# Patient Record
Sex: FEMALE | Race: WHITE | Employment: UNEMPLOYED | URBAN - METROPOLITAN AREA
[De-identification: names, ages, dates, MRNs, and addresses within clinical notes are randomized per-mention and may not be internally consistent; named-entity substitution may affect disease eponyms.]

---

## 2017-03-29 ENCOUNTER — HOSPITAL ENCOUNTER (EMERGENCY)
Age: 27
Discharge: HOME | End: 2017-03-29
Attending: EMERGENCY MEDICINE
Payer: SELF-PAY

## 2017-03-29 VITALS
RESPIRATION RATE: 22 BRPM | OXYGEN SATURATION: 98 % | HEART RATE: 80 BPM | SYSTOLIC BLOOD PRESSURE: 134 MMHG | TEMPERATURE: 97.3 F | DIASTOLIC BLOOD PRESSURE: 83 MMHG | HEIGHT: 71 IN | WEIGHT: 140 LBS | BODY MASS INDEX: 19.6 KG/M2

## 2017-03-29 DIAGNOSIS — F41.1 ANXIETY STATE: ICD-10-CM

## 2017-03-29 DIAGNOSIS — N30.00 ACUTE CYSTITIS WITHOUT HEMATURIA: Primary | ICD-10-CM

## 2017-03-29 DIAGNOSIS — R19.7 DIARRHEA, UNSPECIFIED TYPE: ICD-10-CM

## 2017-03-29 LAB
AMORPH CRY URNS QL MICRO: ABNORMAL
ANION GAP BLD CALC-SCNC: 7 MMOL/L (ref 3–18)
APPEARANCE UR: CLEAR
BACTERIA URNS QL MICRO: NEGATIVE /HPF
BASOPHILS # BLD AUTO: 0.1 K/UL (ref 0–0.1)
BASOPHILS # BLD: 0 % (ref 0–2)
BILIRUB UR QL: NEGATIVE
BUN SERPL-MCNC: 12 MG/DL (ref 7–18)
BUN/CREAT SERPL: 14 (ref 12–20)
CALCIUM SERPL-MCNC: 9.3 MG/DL (ref 8.5–10.1)
CHLORIDE SERPL-SCNC: 105 MMOL/L (ref 100–108)
CO2 SERPL-SCNC: 28 MMOL/L (ref 21–32)
COLOR UR: YELLOW
CREAT SERPL-MCNC: 0.87 MG/DL (ref 0.6–1.3)
DIFFERENTIAL METHOD BLD: ABNORMAL
EOSINOPHIL # BLD: 0.1 K/UL (ref 0–0.4)
EOSINOPHIL NFR BLD: 1 % (ref 0–5)
EPITH CASTS URNS QL MICRO: ABNORMAL /LPF (ref 0–5)
ERYTHROCYTE [DISTWIDTH] IN BLOOD BY AUTOMATED COUNT: 13.2 % (ref 11.6–14.5)
GLUCOSE SERPL-MCNC: 89 MG/DL (ref 74–99)
GLUCOSE UR STRIP.AUTO-MCNC: NEGATIVE MG/DL
HCG UR QL: NEGATIVE
HCT VFR BLD AUTO: 41.3 % (ref 35–45)
HGB BLD-MCNC: 13.8 G/DL (ref 12–16)
HGB UR QL STRIP: NEGATIVE
KETONES UR QL STRIP.AUTO: NEGATIVE MG/DL
LEUKOCYTE ESTERASE UR QL STRIP.AUTO: ABNORMAL
LYMPHOCYTES # BLD AUTO: 19 % (ref 21–52)
LYMPHOCYTES # BLD: 2.7 K/UL (ref 0.9–3.6)
MCH RBC QN AUTO: 30.1 PG (ref 24–34)
MCHC RBC AUTO-ENTMCNC: 33.4 G/DL (ref 31–37)
MCV RBC AUTO: 90 FL (ref 74–97)
MONOCYTES # BLD: 0.7 K/UL (ref 0.05–1.2)
MONOCYTES NFR BLD AUTO: 5 % (ref 3–10)
NEUTS SEG # BLD: 10.4 K/UL (ref 1.8–8)
NEUTS SEG NFR BLD AUTO: 75 % (ref 40–73)
NITRITE UR QL STRIP.AUTO: NEGATIVE
PH UR STRIP: 7.5 [PH] (ref 5–8)
PLATELET # BLD AUTO: 404 K/UL (ref 135–420)
PMV BLD AUTO: 11.3 FL (ref 9.2–11.8)
POTASSIUM SERPL-SCNC: 3.7 MMOL/L (ref 3.5–5.5)
PROT UR STRIP-MCNC: NEGATIVE MG/DL
RBC # BLD AUTO: 4.59 M/UL (ref 4.2–5.3)
RBC #/AREA URNS HPF: ABNORMAL /HPF (ref 0–5)
SODIUM SERPL-SCNC: 140 MMOL/L (ref 136–145)
SP GR UR REFRACTOMETRY: <1.005 (ref 1–1.03)
UROBILINOGEN UR QL STRIP.AUTO: 0.2 EU/DL (ref 0.2–1)
WBC # BLD AUTO: 13.8 K/UL (ref 4.6–13.2)
WBC URNS QL MICRO: ABNORMAL /HPF (ref 0–4)

## 2017-03-29 PROCEDURE — 81001 URINALYSIS AUTO W/SCOPE: CPT | Performed by: PHYSICIAN ASSISTANT

## 2017-03-29 PROCEDURE — 81025 URINE PREGNANCY TEST: CPT | Performed by: PHYSICIAN ASSISTANT

## 2017-03-29 PROCEDURE — 80048 BASIC METABOLIC PNL TOTAL CA: CPT | Performed by: PHYSICIAN ASSISTANT

## 2017-03-29 PROCEDURE — 99282 EMERGENCY DEPT VISIT SF MDM: CPT

## 2017-03-29 PROCEDURE — 85025 COMPLETE CBC W/AUTO DIFF WBC: CPT | Performed by: PHYSICIAN ASSISTANT

## 2017-03-29 RX ORDER — CIPROFLOXACIN 500 MG/1
500 TABLET ORAL 2 TIMES DAILY
Qty: 14 TAB | Refills: 0 | Status: SHIPPED | OUTPATIENT
Start: 2017-03-29 | End: 2017-04-05

## 2017-03-29 NOTE — ED TRIAGE NOTES
Pt states she was at work, when she began having severe abdominal cramping, emergent diarrhea x 2, with nausea and no vomiting. Pt state she felt like she was going to \"pass out and then began having an anxiety attack. Pt presents hyperventilating, with circumoral numbness and hands in a cramped position.

## 2017-03-29 NOTE — ED PROVIDER NOTES
HPI Comments: 23yo F presents due to anxiety as well as diarrhea. She experienced abdominal cramping and diarrhea twice today a/w nausea but no vomitnig. She felt lightheaded during this episode which caused her to panic; developed perioral numbness, cramping in her hands, and hyperventilation. The symptoms have resolved since arrival to the ED. She denies abdominal pain currently, only occurred prior to episodes of diarrhea, described as a crampy pain. Diarrhea has been present for 3-4 days. Patient is a 23 y.o. female presenting with abdominal pain, diarrhea, nausea, and dizziness. Abdominal Pain    Associated symptoms include diarrhea and nausea. Pertinent negatives include no fever, no vomiting, no dysuria and no chest pain. Diarrhea    Associated symptoms include abdominal pain. Pertinent negatives include no vomiting. Nausea    Associated symptoms include abdominal pain and diarrhea. Pertinent negatives include no fever. Dizziness    Associated symptoms include abdominal pain, nausea and light-headedness. Pertinent negatives include no chest pain, no confusion, no fever and no vomiting. Her past medical history does not include no syncope. Past Medical History:   Diagnosis Date    History of urinary incontinence        History reviewed. No pertinent surgical history.       Family History:   Problem Relation Age of Onset    Hypertension Mother     Depression Mother     High Cholesterol Mother     Hypertension Father     High Cholesterol Father     Stroke Maternal Grandfather     Diabetes Paternal Grandmother     Hypertension Paternal Grandmother     High Cholesterol Paternal Grandmother     Diabetes Paternal Grandfather     Hypertension Paternal Grandfather     Elevated Lipids Paternal Grandfather     Heart Attack Paternal Grandmother     Other Maternal Grandmother      blood clot- unknown location        Social History     Social History    Marital status:  Spouse name: N/A    Number of children: N/A    Years of education: N/A     Occupational History    Not on file. Social History Main Topics    Smoking status: Former Smoker     Packs/day: 1.00     Years: 7.00     Types: Cigarettes    Smokeless tobacco: Never Used    Alcohol use Yes      Comment: 2 drinks per month    Drug use: No      Comment: past use    Sexual activity: Yes     Partners: Male     Birth control/ protection: None     Other Topics Concern    Not on file     Social History Narrative         ALLERGIES: Review of patient's allergies indicates no known allergies. Review of Systems   Constitutional: Negative for fever. HENT: Negative for facial swelling. Eyes: Negative for visual disturbance. Respiratory: Positive for shortness of breath. Cardiovascular: Negative for chest pain. Gastrointestinal: Positive for abdominal pain, diarrhea and nausea. Negative for blood in stool and vomiting. Genitourinary: Negative for dysuria. Musculoskeletal: Negative for neck pain. Skin: Negative for rash. Neurological: Positive for light-headedness and numbness. Negative for syncope. Psychiatric/Behavioral: Negative for confusion. All other systems reviewed and are negative. Vitals:    03/29/17 1804   BP: 134/83   Pulse: 80   Resp: 22   Temp: 97.3 °F (36.3 °C)   SpO2: 98%   Weight: 63.5 kg (140 lb)   Height: 5' 11\" (1.803 m)            Physical Exam   Constitutional: She is oriented to person, place, and time. She appears well-developed and well-nourished. No distress. HENT:   Head: Normocephalic and atraumatic. Eyes: Conjunctivae are normal.   Neck: Normal range of motion. Cardiovascular: Normal rate, regular rhythm and normal heart sounds. No murmur heard. Pulmonary/Chest: Effort normal and breath sounds normal. She has no wheezes. Abdominal: Soft. Bowel sounds are normal. She exhibits no distension. There is no tenderness. Musculoskeletal: Normal range of motion. Neurological: She is alert and oriented to person, place, and time. Skin: Skin is warm and dry. She is not diaphoretic. Psychiatric: She has a normal mood and affect. Nursing note and vitals reviewed. MDM  Number of Diagnoses or Management Options  Acute cystitis without hematuria: new and requires workup  Anxiety state: new and requires workup  Diarrhea, unspecified type: new and requires workup  Diagnosis management comments: Diarrhea x 3 days. No abdominal tenderness. Afebrile. Vitals stable. Anxiety has resolved, she does have history of panic attacks. UA + UTI: treated with abx. Rehydrated in ED. Electrolytes stable. Discussed treatment plan, return precautions, symptomatic relief, and expected time to improvement. All questions answered. Patient is stable for discharge and outpatient management. Amount and/or Complexity of Data Reviewed  Clinical lab tests: ordered and reviewed      ED Course       Procedures      Diagnosis:   1. Acute cystitis without hematuria    2. Diarrhea, unspecified type    3. Anxiety state          Disposition: home    Follow-up Information     Follow up With Details Comments Contact Info    [facility] In 2 days If symptoms do not improve [address] [place] [address]    [facility]  Immediately if symptoms worsen [address] [facility] [address]  [phone]          Patient's Medications   Start Taking    CIPROFLOXACIN HCL (CIPRO) 500 MG TABLET    Take 1 Tab by mouth two (2) times a day for 7 days.    Continue Taking    No medications on file   These Medications have changed    No medications on file   Stop Taking    No medications on file     Miguel Ángel Madera PA-C

## 2017-03-30 NOTE — DISCHARGE INSTRUCTIONS
It is important to stay hydrated during episodes of diarrhea and/or vomiting. Return to ER for worsening abdominal pain, high fevers, blood in stool or vomit, severe vomiting, diarrhea greater than 7 days, dehydration or inability to keep down liquids, or other worsening symptoms. Return to ED if symptoms have not improved in 24-48 hours. Complete entire course of antibiotics. Rest and drink lots of water. Sugar-free cranberry juice may help as well. To prevent reoccurrence, always urinate immediately before and after sexual activity. Always wipe from front to back. Drink water regularly. Return to ED if symptoms do not improve in 24 hours. Return to ED sooner if symptoms worsen, pain worsens, or high fevers occur. Diarrhea: Care Instructions  Your Care Instructions    Diarrhea is loose, watery stools (bowel movements). The exact cause is often hard to find. Sometimes diarrhea is your body's way of getting rid of what caused an upset stomach. Viruses, food poisoning, and many medicines can cause diarrhea. Some people get diarrhea in response to emotional stress, anxiety, or certain foods. Almost everyone has diarrhea now and then. It usually isn't serious, and your stools will return to normal soon. The important thing to do is replace the fluids you have lost, so you can prevent dehydration. The doctor has checked you carefully, but problems can develop later. If you notice any problems or new symptoms, get medical treatment right away. Follow-up care is a key part of your treatment and safety. Be sure to make and go to all appointments, and call your doctor if you are having problems. It's also a good idea to know your test results and keep a list of the medicines you take. How can you care for yourself at home? · Watch for signs of dehydration, which means your body has lost too much water. Dehydration is a serious condition and should be treated right away.  Signs of dehydration are:  ¨ Increasing thirst and dry eyes and mouth. ¨ Feeling faint or lightheaded. ¨ Darker urine, and a smaller amount of urine than normal.  · To prevent dehydration, drink plenty of fluids, enough so that your urine is light yellow or clear like water. Choose water and other caffeine-free clear liquids until you feel better. If you have kidney, heart, or liver disease and have to limit fluids, talk with your doctor before you increase the amount of fluids you drink. · Begin eating small amounts of mild foods the next day, if you feel like it. ¨ Try yogurt that has live cultures of Lactobacillus. (Check the label.)  ¨ Avoid spicy foods, fruits, alcohol, and caffeine until 48 hours after all symptoms are gone. ¨ Avoid chewing gum that contains sorbitol. ¨ Avoid dairy products (except for yogurt with Lactobacillus) while you have diarrhea and for 3 days after symptoms are gone. · The doctor may recommend that you take over-the-counter medicine, such as loperamide (Imodium), if you still have diarrhea after 6 hours. Read and follow all instructions on the label. Do not use this medicine if you have bloody diarrhea, a high fever, or other signs of serious illness. Call your doctor if you think you are having a problem with your medicine. When should you call for help? Call 911 anytime you think you may need emergency care. For example, call if:  · You passed out (lost consciousness). · Your stools are maroon or very bloody. Call your doctor now or seek immediate medical care if:  · You are dizzy or lightheaded, or you feel like you may faint. · Your stools are black and look like tar, or they have streaks of blood. · You have new or worse belly pain. · You have symptoms of dehydration, such as:  ¨ Dry eyes and a dry mouth. ¨ Passing only a little dark urine. ¨ Feeling thirstier than usual.  · You have a new or higher fever.   Watch closely for changes in your health, and be sure to contact your doctor if:  · Your diarrhea is getting worse. · You see pus in the diarrhea. · You are not getting better after 2 days (48 hours). Where can you learn more? Go to http://lang-cindy.info/. Enter X513 in the search box to learn more about \"Diarrhea: Care Instructions. \"  Current as of: May 27, 2016  Content Version: 11.2  © 2246-3047 [employer]. Care instructions adapted under license by [employer] (which disclaims liability or warranty for this information). If you have questions about a medical condition or this instruction, always ask your healthcare professional. [place] [address] any warranty or liability for your use of this information. Learning About Anxiety Disorders  What are anxiety disorders? Anxiety disorders are a type of medical problem. They cause severe anxiety. When you feel anxious, you feel that something bad is about to happen. This feeling interferes with your life. These disorders include:  · Generalized anxiety disorder. You feel worried and stressed about many everyday events and activities. This goes on for several months and disrupts your life on most days. · Panic disorder. You have repeated panic attacks. A panic attack is a sudden, intense fear or anxiety. It may make you feel short of breath. Your heart may pound. · Social anxiety disorder. You feel very anxious about what you will say or do in front of people. For example, you may be scared to talk or eat in public. This problem affects your daily life. · Phobias. You are very scared of a specific object, situation, or activity. For example, you may fear spiders, high places, or small spaces. What are the symptoms? Generalized anxiety disorder  Symptoms may include:  · Feeling worried and stressed about many things almost every day. · Feeling tired or irritable. You may have a hard time concentrating. · Having headaches or muscle aches.   · Having a hard time swallowing. · Feeling shaky, sweating, or having hot flashes. Panic disorder  You may have repeated panic attacks when there is no reason for feeling afraid. You may change your daily activities because you worry that you will have another attack. Symptoms may include:  · Intense fear, terror, or anxiety. · Trouble breathing or very fast breathing. · Chest pain or tightness. · A heartbeat that races or is not regular. Social anxiety disorder  Symptoms may include:  · Fear about a social situation, such as eating in front of others or speaking in public. You may worry a lot. Or you may be afraid that something bad will happen. · Anxiety that can cause you to blush, sweat, and feel shaky. · A heartbeat that is faster than normal.  · A hard time focusing. Phobias  Symptoms may include:  · More fear than most people of being around an object, being in a situation, or doing an activity. You might also be stressed about the chance of being around the thing you fear. · Worry about losing control, panicking, fainting, or having physical symptoms like a faster heartbeat when you are around the situation or object. How are these disorders treated? Anxiety disorders can be treated with medicines or counseling. A combination of both may be used. Medicines may include:  · Antidepressants. These may help your symptoms by keeping chemicals in your brain in balance. · Benzodiazepines. These may give you short-term relief of your symptoms. Some people use cognitive-behavioral therapy. A therapist helps you learn to change stressful or bad thoughts into helpful thoughts. Lead a healthy lifestyle  A healthy lifestyle may help you feel better. · Get at least 30 minutes of exercise on most days of the week. Walking is a good choice. · Eat a healthy diet. Include fruits, vegetables, lean proteins, and whole grains in your diet each day. · Try to go to bed at the same time every night.  Try for 8 hours of sleep a night.  · Find ways to manage stress. Try relaxation exercises. · Avoid alcohol and illegal drugs. Follow-up care is a key part of your treatment and safety. Be sure to make and go to all appointments, and call your doctor if you are having problems. It's also a good idea to know your test results and keep a list of the medicines you take. Where can you learn more? Go to http://lang-cindy.info/. Enter J560 in the search box to learn more about \"Learning About Anxiety Disorders. \"  Current as of: July 26, 2016  Content Version: 11.2  © 4261-9102 [employer]. Care instructions adapted under license by [employer] (which disclaims liability or warranty for this information). If you have questions about a medical condition or this instruction, always ask your healthcare professional. [address] any warranty or liability for your use of this information. Urinary Tract Infection in Women: Care Instructions  Your Care Instructions    A urinary tract infection, or UTI, is a general term for an infection anywhere between the kidneys and the urethra (where urine comes out). Most UTIs are bladder infections. They often cause pain or burning when you urinate. UTIs are caused by bacteria and can be cured with antibiotics. Be sure to complete your treatment so that the infection goes away. Follow-up care is a key part of your treatment and safety. Be sure to make and go to all appointments, and call your doctor if you are having problems. It's also a good idea to know your test results and keep a list of the medicines you take. How can you care for yourself at home? · Take your antibiotics as directed. Do not stop taking them just because you feel better. You need to take the full course of antibiotics. · Drink extra water and other fluids for the next day or two. This may help wash out the bacteria that are causing the infection.  (If you have kidney, heart, or liver disease and have to limit fluids, talk with your doctor before you increase your fluid intake.)  · Avoid drinks that are carbonated or have caffeine. They can irritate the bladder. · Urinate often. Try to empty your bladder each time. · To relieve pain, take a hot bath or lay a heating pad set on low over your lower belly or genital area. Never go to sleep with a heating pad in place. To prevent UTIs  · Drink plenty of water each day. This helps you urinate often, which clears bacteria from your system. (If you have kidney, heart, or liver disease and have to limit fluids, talk with your doctor before you increase your fluid intake.)  · Urinate when you need to. · Urinate right after you have sex. · Change sanitary pads often. · Avoid douches, bubble baths, feminine hygiene sprays, and other feminine hygiene products that have deodorants. · After going to the bathroom, wipe from front to back. When should you call for help? Call your doctor now or seek immediate medical care if:  · Symptoms such as fever, chills, nausea, or vomiting get worse or appear for the first time. · You have new pain in your back just below your rib cage. This is called flank pain. · There is new blood or pus in your urine. · You have any problems with your antibiotic medicine. Watch closely for changes in your health, and be sure to contact your doctor if:  · You are not getting better after taking an antibiotic for 2 days. · Your symptoms go away but then come back. Where can you learn more? Go to http://lang-cindy.info/. Enter S315 in the search box to learn more about \"Urinary Tract Infection in Women: Care Instructions. \"  Current as of: November 28, 2016  Content Version: 11.2  © 8304-8300 [employer]. Care instructions adapted under license by [employer] (which disclaims liability or warranty for this information).  If you have questions about a medical condition or this instruction, always ask your healthcare professional. [place] [address] any warranty or liability for your use of this information.